# Patient Record
Sex: FEMALE
[De-identification: names, ages, dates, MRNs, and addresses within clinical notes are randomized per-mention and may not be internally consistent; named-entity substitution may affect disease eponyms.]

---

## 2020-08-13 ENCOUNTER — APPOINTMENT (OUTPATIENT)
Dept: ORTHOPEDIC SURGERY | Facility: CLINIC | Age: 20
End: 2020-08-13
Payer: COMMERCIAL

## 2020-08-13 VITALS
DIASTOLIC BLOOD PRESSURE: 70 MMHG | WEIGHT: 124 LBS | HEIGHT: 63 IN | BODY MASS INDEX: 21.97 KG/M2 | SYSTOLIC BLOOD PRESSURE: 130 MMHG | HEART RATE: 78 BPM

## 2020-08-13 VITALS — TEMPERATURE: 97.3 F

## 2020-08-13 DIAGNOSIS — Z78.9 OTHER SPECIFIED HEALTH STATUS: ICD-10-CM

## 2020-08-13 DIAGNOSIS — M41.129 ADOLESCENT IDIOPATHIC SCOLIOSIS, SITE UNSPECIFIED: ICD-10-CM

## 2020-08-13 DIAGNOSIS — S93.491A SPRAIN OF OTHER LIGAMENT OF RIGHT ANKLE, INITIAL ENCOUNTER: ICD-10-CM

## 2020-08-13 PROCEDURE — 99204 OFFICE O/P NEW MOD 45 MIN: CPT

## 2020-08-13 PROCEDURE — 72082 X-RAY EXAM ENTIRE SPI 2/3 VW: CPT

## 2020-08-13 NOTE — ADDENDUM
[FreeTextEntry1] : Documented by Morteza Tolentino acting as a scribe for TIBURCIO Avila on 08/13/2020\par All medical record entries made by the Scribe were at my, TIBURCIO Avila, direction and personally dictated by me on 08/13/2020 . I have reviewed the chart and agree that the record accurately reflects my personal performance of the history, physical exam, assessment and plan. I have also personally directed, reviewed, and agreed with the chart.

## 2020-08-13 NOTE — DISCUSSION/SUMMARY
[de-identified] : I have recommended that the pt continue with a conservative treatment plan. Pt has been referred to physical therapy for decreased pain modalities, core strengthening modalities, soft tissue modalities, and physical modalities. Pt has no limit to her activity, and we encouraged an active lifestyle. We also spoke about the benefits of using heat, ice, and Bengay cream. Pt can take anti-inflammatories PRN pain. The pt may follow up here on a PRN basis.

## 2020-08-13 NOTE — PHYSICAL EXAM
[Poor Appearance] : well-appearing [Acute Distress] : not in acute distress [Obese] : not obese [de-identified] : CONSTITUTIONAL: The patient is a very pleasant individual who is well-nourished and who appears stated age.\par PSYCHIATRIC: The patient is alert and oriented X 3 and in no apparent distress, and participates with orthopedic evaluation well.\par HEAD: Atraumatic and is nonsyndromic in appearance.\par EENT: No visible thyromegaly, EOMI.\par RESPIRATORY: Respiratory rate is regular, not dyspneic on examination.\par LYMPHATICS: There is no inguinal lymphadenopathy\par INTEGUMENTARY: Skin is clean, dry, and intact about the bilateral lower extremities and lumbar spine.\par VASCULAR: There is brisk capillary refill about the bilateral lower extremities.\par NEUROLOGIC: There are no pathologic reflexes. There is no decrease in sensation of the bilateral lower extremities on Wartenberg pinwheel examination. Deep tendon reflexes are well maintained at 2+/4 of the bilateral lower extremities and are symmetric.\par MUSCULOSKELETAL: There is no visible muscular atrophy. Manual motor strength is well maintained in the bilateral lower extremities. Range of motion of lumbar spine is well maintained. The patient ambulates in a non-myelopathic manner. Negative tension sign and straight leg raise bilaterally. Quad extension, ankle dorsiflexion, EHL, plantar flexion, and ankle eversion are well preserved. Normal secondary orthopaedic exam of bilateral hips, greater trochanteric area, knees and ankles \par \par UE strength is 5/5. mild gibbus on the right. no c/o pain. [de-identified] : Xray of the lumbar spine taken today shows 27 degree scoliosis with convexity to the right. Declined

## 2020-08-13 NOTE — HISTORY OF PRESENT ILLNESS
[de-identified] : 19 year old F presents for scoliosis evaluation. Pt states she has no pain, symptoms are described as more soreness or tired. Soreness when she walks or is active. She used to be a runner. She c/o some UE weakness, for example when she lifts a gallon of milk. Someone in the past told her that she cannot do certain activities such as strength training etc. She worse a brace as a teen. [Ataxia] : no ataxia [Incontinence] : no incontinence [Urinary Ret.] : no urinary retention [Loss of Dexterity] : good dexterity

## 2020-12-29 ENCOUNTER — TRANSCRIPTION ENCOUNTER (OUTPATIENT)
Age: 20
End: 2020-12-29

## 2022-01-09 ENCOUNTER — TRANSCRIPTION ENCOUNTER (OUTPATIENT)
Age: 22
End: 2022-01-09

## 2022-09-24 ENCOUNTER — OFFICE (OUTPATIENT)
Dept: URBAN - METROPOLITAN AREA CLINIC 6 | Facility: CLINIC | Age: 22
Setting detail: OPHTHALMOLOGY
End: 2022-09-24
Payer: COMMERCIAL

## 2022-09-24 DIAGNOSIS — H52.13: ICD-10-CM

## 2022-09-24 PROCEDURE — SCREF LASIK EVAL: Performed by: OPHTHALMOLOGY

## 2022-09-24 ASSESSMENT — REFRACTION_AUTOREFRACTION
OD_AXIS: 55
OS_SPHERE: -2.75
OS_AXIS: 92
OS_CYLINDER: -0.50
OD_CYLINDER: -0.25
OD_SPHERE: -2.75

## 2022-09-24 ASSESSMENT — REFRACTION_MANIFEST
OS_SPHERE: -2.75
OD_AXIS: 55
OS_AXIS: 92
OD_SPHERE: -2.75
OS_VA1: 20/20-
OD_VA1: 20/20
OD_CYLINDER: -0.25
OS_CYLINDER: -0.50

## 2022-09-24 ASSESSMENT — CONFRONTATIONAL VISUAL FIELD TEST (CVF)
OD_FINDINGS: FULL
OS_FINDINGS: FULL

## 2022-09-24 ASSESSMENT — SPHEQUIV_DERIVED
OS_SPHEQUIV: -3
OD_SPHEQUIV: -2.875
OD_SPHEQUIV: -2.875
OS_SPHEQUIV: -3

## 2022-09-24 ASSESSMENT — AXIALLENGTH_DERIVED
OD_AL: 38.55
OS_AL: 25.5542
OD_AL: 38.55
OS_AL: 25.5542

## 2022-09-24 ASSESSMENT — KERATOMETRY
OD_K1POWER_DIOPTERS: 41.50
OS_K2POWER_DIOPTERS: 41.75
OS_K1POWER_DIOPTERS: 41.75
OS_AXISANGLE_DEGREES: 90
METHOD_AUTO_MANUAL: AUTO
OD_K2POWER_DIOPTERS: 1.75
OD_AXISANGLE_DEGREES: 113

## 2022-09-24 ASSESSMENT — TONOMETRY
OD_IOP_MMHG: 16
OS_IOP_MMHG: 17

## 2022-09-24 ASSESSMENT — VISUAL ACUITY
OS_BCVA: 20/20-1
OD_BCVA: 20/25+

## 2025-03-11 ENCOUNTER — APPOINTMENT (OUTPATIENT)
Dept: PSYCHIATRY | Facility: CLINIC | Age: 25
End: 2025-03-11